# Patient Record
Sex: FEMALE | Race: WHITE | URBAN - METROPOLITAN AREA
[De-identification: names, ages, dates, MRNs, and addresses within clinical notes are randomized per-mention and may not be internally consistent; named-entity substitution may affect disease eponyms.]

---

## 2024-11-11 ENCOUNTER — OFFICE VISIT (OUTPATIENT)
Dept: RHEUMATOLOGY | Facility: CLINIC | Age: 39
End: 2024-11-11
Payer: COMMERCIAL

## 2024-11-11 VITALS
TEMPERATURE: 97 F | HEART RATE: 68 BPM | WEIGHT: 182.63 LBS | OXYGEN SATURATION: 95 % | DIASTOLIC BLOOD PRESSURE: 82 MMHG | SYSTOLIC BLOOD PRESSURE: 116 MMHG | HEIGHT: 67 IN | RESPIRATION RATE: 16 BRPM | BODY MASS INDEX: 28.66 KG/M2

## 2024-11-11 DIAGNOSIS — S93.491S SPRAIN OF OTHER LIGAMENT OF RIGHT ANKLE, SEQUELA: ICD-10-CM

## 2024-11-11 DIAGNOSIS — M75.81 RIGHT ROTATOR CUFF TENDINITIS: ICD-10-CM

## 2024-11-11 DIAGNOSIS — R76.8 POSITIVE ANA (ANTINUCLEAR ANTIBODY): Primary | ICD-10-CM

## 2024-11-11 DIAGNOSIS — M25.50 HYPERMOBILITY ARTHRALGIA: ICD-10-CM

## 2024-11-11 DIAGNOSIS — I73.00 RAYNAUD'S DISEASE WITHOUT GANGRENE: ICD-10-CM

## 2024-11-11 DIAGNOSIS — L65.9 ALOPECIA: ICD-10-CM

## 2024-11-11 PROBLEM — S93.491A SPRAIN OF TALOFIBULAR LIGAMENT OF RIGHT ANKLE: Status: ACTIVE | Noted: 2024-03-03

## 2024-11-11 PROCEDURE — 3074F SYST BP LT 130 MM HG: CPT | Performed by: INTERNAL MEDICINE

## 2024-11-11 PROCEDURE — 3079F DIAST BP 80-89 MM HG: CPT | Performed by: INTERNAL MEDICINE

## 2024-11-11 PROCEDURE — 3008F BODY MASS INDEX DOCD: CPT | Performed by: INTERNAL MEDICINE

## 2024-11-11 PROCEDURE — 99204 OFFICE O/P NEW MOD 45 MIN: CPT | Performed by: INTERNAL MEDICINE

## 2024-11-11 RX ORDER — CLOBETASOL PROPIONATE 0.5 MG/ML
1 SOLUTION TOPICAL
COMMUNITY
Start: 2024-08-26

## 2024-11-11 RX ORDER — MELOXICAM 15 MG/1
1 TABLET ORAL DAILY
COMMUNITY
Start: 2024-04-04

## 2024-11-11 RX ORDER — KETOCONAZOLE 20 MG/ML
1 SHAMPOO, SUSPENSION TOPICAL
COMMUNITY
Start: 2024-11-05

## 2024-11-11 RX ORDER — FLUTICASONE PROPIONATE 50 MCG
1 SPRAY, SUSPENSION (ML) NASAL DAILY
COMMUNITY

## 2024-11-11 RX ORDER — FAMOTIDINE 20 MG/1
1 TABLET, FILM COATED ORAL 2 TIMES DAILY
COMMUNITY

## 2024-11-11 RX ORDER — HYDROXYCHLOROQUINE SULFATE 200 MG/1
200 TABLET, FILM COATED ORAL DAILY
COMMUNITY
Start: 2024-10-31

## 2024-11-11 RX ORDER — CETIRIZINE HYDROCHLORIDE 10 MG/1
10 TABLET ORAL DAILY
COMMUNITY

## 2024-11-11 NOTE — PATIENT INSTRUCTIONS
More reading about undifferentiated connective tissue disease (UCTD):   [email or web address]

## 2024-11-11 NOTE — PROGRESS NOTES
Rheumatology New Patient Note  =====================================================================================================    Date of visit: 11/11/2024  ?  Chief complaint: +CATIA   Chief Complaint   Patient presents with    New Patient     New patient presents for a second opinion for possible Lupus.      Referring (will send letter)  PCP  CASTRO JEAN MD    =====================================================================================================  HPI    Susi Owusu is a 39 year old female     Here for second opinion of potential SLE  Saw Dr. Lujan (rheum in [place]) for a +CATIA.  She was having some hair thinning, scalp rash, fatigue which led to the positive CATIA workup by PCP.  -Per rheum, noted hair loss was not due to lupus definitely  -saw derm: recommended hair vitamins and ketoconazole shampoo and topical corticosteroids which did help.  -rash: from a few years ago. Saw allergist, started on zyrtec and issue resolved.   -easy bruising.   -Raynaud's: white phasic changes noted. X 1-2 years.  -dry eye noted x 1 year.   -intermittent oral ulcers only lasting 1 day or so.   -on hydroxychloroquine (plaquenil) 200 mg daily since August 2024. Unclear if this is helping    Denies current malar rash, photosensitivity rash, discoid lesions, nasal ulcers, pleuritic chest pain, arthritis, seizures/psychosis, dry eyes/mouth, or blood clots.    Denies miscarriages or obstetric events (early pre-eclampsia, IUGR, placental insufficiency)  -Prior pregnancies and/or children:  --Pregnancy complications    Mother and sisters with RA.   Aunt with fibromyalgia     14 point ROS negative except noted above    Medications:  Medications Ordered Prior to Encounter[1]    Past Medical History:  History reviewed. No pertinent past medical history.  Past Surgical History:  Past Surgical History:   Procedure Laterality Date    Cholecystectomy      2016     Family History:  Family History   Problem Relation  Age of Onset    Arthritis Mother         RA    Arthritis Other         moms sisters have RA     Social History:  Social History     Socioeconomic History    Marital status:    Tobacco Use    Smoking status: Never    Smokeless tobacco: Never   Vaping Use    Vaping status: Never Used   Substance and Sexual Activity    Alcohol use: Yes    Drug use: Never     ?  Allergies:  Allergies[2]      Objective    Vitals:    11/11/24 1129   BP: 116/82   Pulse: 68   Resp: 16   Temp: 97.3 °F (36.3 °C)   SpO2: 95%   Weight: 182 lb 9.6 oz (82.8 kg)   Height: 5' 7\" (1.702 m)       GEN: NAD, well-nourished.   HEENT: Head: NCAT. Face: No lesions. Eyes: Conjunctiva clear. Sclera are anicteric. PERRLA. EOMs are full. Ears: The right and left ear canals are clear.  Nose: No external or internal nasal deformities. Nasal septum is midline. Mouth: The lips are within normal limits.  No oral ulcers Tongue is midline with no lesions. The oral cavity is clear.   Neck: Supple. No neck masses. No thyromegaly. No LAD, parotid or submandicular gland palpated.   CV: RRR, no mrg, S1/S2  PULM: CTAB, no wrr, easy effort  Abd: s/nt/nd  Extremities: No cyanosis, edema or deformities.   Neurologic: Strength, CN2-12 grossly intact   Psych: normal affect.   Skin: No lesions or rashes.  MSK: 28 joint count performed. No evidence of synovitis in mcp, pip, dip, wrist, elbows, shoulders, hips, knees, ankles, mtp unless otherwise noted. Full ROM of elbows, wrists, knees.    Hands- No ulceration/lesions noted. No swelling in IPJs, no TTP or synovitis noted in joints of hand   Wrists- No pain with wrist flexion and extension. No swelling, erythema, or increased warmth.   Elbows- No swelling, erythema, or increased warmth.   Shoulders- FROM, abduction ~180 degrees bilaterally.    Knees- No swelling, erythema, or increased warmth. AROM flexion/extension ~0-180 degrees.    No valgus/varus laxities appreciated.   Ankles/Feet- No swelling, erythema, or increased  warmth.      [place]  ?Passive dorsiflexion of the fifth finger >90 degrees with forearm flat: no    ?Passive apposition of the thumb to the flexor aspect of the forearm: no    ?Hyperextension of elbow >10 degrees: no    ?Hyperextensibility of the knee >10 degrees: yes bilateral    ?Flexion of waist with palms on the floor (and with the knees fully extended): no    ?  Right shoulder: Positive Smiley    Labs:  5/30/2024 [facility] labs  CATIA 1: 640 speckled  C3/C4 WNL  U/a WNL    No results found for: \"WBC\", \"RBC\", \"HGB\", \"HCT\", \"PLT\", \"MPV\", \"MCV\", \"MCH\", \"MCHC\", \"RDW\", \"NEPRELIM\", \"NEUTABS\", \"LYMPHABS\", \"EOSABS\", \"BASABS\", \"NEUT\", \"LYMPH\", \"MON\", \"EOS\", \"BASO\", \"NEPERCENT\", \"LYPERCENT\", \"MOPERCENT\", \"EOPERCENT\", \"BAPERCENT\", \"NE\", \"LYMABS\", \"MOABSO\", \"EOABSO\", \"BAABSO\"  No results found for: \"GLU\", \"BUN\", \"BUNCREA\", \"CREATSERUM\", \"ANIONGAP\", \"GFR\", \"GFRNAA\", \"GFRAA\", \"CA\", \"OSMOCALC\", \"ALKPHO\", \"AST\", \"ALT\", \"ALKPHOS\", \"BILT\", \"TP\", \"ALB\", \"GLOBULIN\", \"AGRATIO\", \"NA\", \"K\", \"CL\", \"CO2\"      No results found for: \"ANATI\", \"CATIA\", \"ANAS\", \"ANASCRN\", \"ANASCRNRFLX\", \"LUCIE\"  No results found for: \"SSA\", \"SSAUR\", \"ANTISSA\", \"SSA52\", \"SSA60\", \"SSADD\", \"SSB\", \"ANTISSB\"  No results found for: \"DSDNA\", \"ANTIDSDNA\", \"SMUD\", \"ANTISM\", \"SM\", \"RNP\", \"ANTIRNP\", \"SMITHRNP\"  No results found for: \"SCL70\", \"SCL\", \"WSEFJCF97\"  No results found for: \"C3\", \"C4\"  No results found for: \"DRVVT\", \"LAINT\", \"PTTLUPUS\", \"LUPUSINTERP\", \"LA\", \"E8DH9OYCZV\", \"G7DY8CEYTC\", \"F8GRPQBIFV\", \"M0RCKYXOFC\"  No results found for: \"CARDIOLIPIGG\", \"CARDIOLIPIGM\", \"CARDIOLIPIGA\", \"CARDIOIGA\", \"CARLIP\"      Additional Labs:    Radiology:    Radiology review:      =====================================================================================================  Assessment and Plan    Assessment:  1. Positive CATIA (antinuclear antibody)    2. Alopecia    3. Raynaud's disease without gangrene    4. Right rotator cuff tendinitis    5. Sprain of other ligament of  right ankle, sequela    6. Hypermobility arthralgia        #Positive CATIA  -Clinical manifestations: Raynaud's (since 2022), dry eye (since 2023), alopecia  -Serologies: CATIA moderate titer in a speckled pattern  -Diagnosed with SLE by outside rheumatologist.  No notes for my review.  Unclear how SLE was diagnosed based on clinical grounds  -Unclear if starting hydroxychloroquine 200 mg daily since August 2024 has led to any clinical improvement    #Alopecia, scalp rash  -Saw dermatology, recommended hair vitamins, ketoconazole shampoo, topical corticosteroids  -Has improved    #fatigue/brain fog    Plan:  -Told the patient that I can either confirm or refute any prior diagnoses at this point.  Need to get prior autoimmune serologies which apparently were completed by the PCP.  Need to see if there were any CATIA by JULIO panels completed, RF/CCP, antiphospholipid antibodies.  Also need to see if there are any brain fog/fatigue/alopecia labs such as vitamin D, vitamin B12/folate, iron studies, TSH that were completed  -At this point, there is certainly something that is contributing to her issues.  Not exactly sure what specific problem and may be  -She does have a long history of hypermobile joints especially in the lower extremities including the knees and ankles.  -There is evidence of right rotator cuff impingement, recently sprained right ankle.  Likely somewhat related to hypermobility.    Rtc prn      Diagnoses and all orders for this visit:    Positive CATIA (antinuclear antibody)    Alopecia    Raynaud's disease without gangrene    Right rotator cuff tendinitis    Sprain of other ligament of right ankle, sequela    Hypermobility arthralgia        No follow-ups on file.      The above plan of care, diagnosis, orders, and follow-up were discussed with the patient. Questions related to this recommended plan of care were answered.    Thank you for referring this delightful patient to me. Please feel free to contact me  with any questions.     This report was performed utilizing speech recognition software technology. Despite proofreading, speech recognition errors could escape detection. If a word or phrase is confusing or out of context, please do not hesitate to call for   clarification.       Kind regards      Saulo Roche MD  [facility]         [1]   Current Outpatient Medications on File Prior to Visit   Medication Sig Dispense Refill    cetirizine 10 MG Oral Tab Take 1 tablet (10 mg total) by mouth daily.      clobetasol 0.05 % External Solution Apply 1 Application topically.      famotidine 20 MG Oral Tab Take 1 tablet (20 mg total) by mouth 2 (two) times daily.      fluticasone propionate 50 MCG/ACT Nasal Suspension 1 spray by Nasal route daily.      hydroxychloroquine 200 MG Oral Tab Take 1 tablet (200 mg total) by mouth daily.      ketoconazole 2 % External Shampoo Apply 1 Application topically 3 (three) times a week.      Meloxicam (MOBIC) 15 MG Oral Tab Take 1 tablet (15 mg total) by mouth daily.      NON FORMULARY OTC Nutraful       No current facility-administered medications on file prior to visit.   [2] No Known Allergies

## 2024-11-25 ENCOUNTER — TELEPHONE (OUTPATIENT)
Dept: RHEUMATOLOGY | Facility: CLINIC | Age: 39
End: 2024-11-25